# Patient Record
Sex: MALE | URBAN - METROPOLITAN AREA
[De-identification: names, ages, dates, MRNs, and addresses within clinical notes are randomized per-mention and may not be internally consistent; named-entity substitution may affect disease eponyms.]

---
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
## 2024-02-13 NOTE — PROCEDURE: INTRALESIONAL KENALOG
No care due was identified.  [facility] Embedded Care Due Messages. Reference number: [id number].   2/13/2024 8:38:16 AM CST   Expiration Date (Optional): 05/2021